# Patient Record
Sex: FEMALE | Race: BLACK OR AFRICAN AMERICAN | NOT HISPANIC OR LATINO | Employment: OTHER | URBAN - METROPOLITAN AREA
[De-identification: names, ages, dates, MRNs, and addresses within clinical notes are randomized per-mention and may not be internally consistent; named-entity substitution may affect disease eponyms.]

---

## 2023-01-17 ENCOUNTER — HOSPITAL ENCOUNTER (EMERGENCY)
Facility: HOSPITAL | Age: 63
Discharge: HOME | End: 2023-01-17
Attending: EMERGENCY MEDICINE
Payer: COMMERCIAL

## 2023-01-17 VITALS
RESPIRATION RATE: 16 BRPM | OXYGEN SATURATION: 98 % | HEIGHT: 62 IN | SYSTOLIC BLOOD PRESSURE: 148 MMHG | HEART RATE: 55 BPM | BODY MASS INDEX: 31.28 KG/M2 | DIASTOLIC BLOOD PRESSURE: 79 MMHG | WEIGHT: 170 LBS | TEMPERATURE: 98 F

## 2023-01-17 DIAGNOSIS — R03.0 ELEVATED BLOOD PRESSURE READING: Primary | ICD-10-CM

## 2023-01-17 DIAGNOSIS — I10 HYPERTENSION: ICD-10-CM

## 2023-01-17 LAB
ALBUMIN SERPL BCP-MCNC: 4 G/DL (ref 3.5–5.2)
ALP SERPL-CCNC: 97 U/L (ref 55–135)
ALT SERPL W/O P-5'-P-CCNC: 26 U/L (ref 10–44)
ANION GAP SERPL CALC-SCNC: 9 MMOL/L (ref 8–16)
AST SERPL-CCNC: 25 U/L (ref 10–40)
BASOPHILS # BLD AUTO: 0.03 K/UL (ref 0–0.2)
BASOPHILS NFR BLD: 0.4 % (ref 0–1.9)
BILIRUB SERPL-MCNC: 0.3 MG/DL (ref 0.1–1)
BUN SERPL-MCNC: 11 MG/DL (ref 8–23)
CALCIUM SERPL-MCNC: 9.5 MG/DL (ref 8.7–10.5)
CHLORIDE SERPL-SCNC: 105 MMOL/L (ref 95–110)
CO2 SERPL-SCNC: 24 MMOL/L (ref 23–29)
CREAT SERPL-MCNC: 0.8 MG/DL (ref 0.5–1.4)
DIFFERENTIAL METHOD: ABNORMAL
EOSINOPHIL # BLD AUTO: 0.1 K/UL (ref 0–0.5)
EOSINOPHIL NFR BLD: 1.4 % (ref 0–8)
ERYTHROCYTE [DISTWIDTH] IN BLOOD BY AUTOMATED COUNT: 12.3 % (ref 11.5–14.5)
EST. GFR  (NO RACE VARIABLE): >60 ML/MIN/1.73 M^2
GLUCOSE SERPL-MCNC: 115 MG/DL (ref 70–110)
HCT VFR BLD AUTO: 39 % (ref 37–48.5)
HCV AB SERPL QL IA: NORMAL
HGB BLD-MCNC: 12.8 G/DL (ref 12–16)
HIV 1+2 AB+HIV1 P24 AG SERPL QL IA: NORMAL
IMM GRANULOCYTES # BLD AUTO: 0.02 K/UL (ref 0–0.04)
IMM GRANULOCYTES NFR BLD AUTO: 0.3 % (ref 0–0.5)
INFLUENZA A, MOLECULAR: NOT DETECTED
INFLUENZA B, MOLECULAR: NOT DETECTED
LYMPHOCYTES # BLD AUTO: 1.2 K/UL (ref 1–4.8)
LYMPHOCYTES NFR BLD: 15.5 % (ref 18–48)
MCH RBC QN AUTO: 27.7 PG (ref 27–31)
MCHC RBC AUTO-ENTMCNC: 32.8 G/DL (ref 32–36)
MCV RBC AUTO: 84 FL (ref 82–98)
MONOCYTES # BLD AUTO: 0.4 K/UL (ref 0.3–1)
MONOCYTES NFR BLD: 5.6 % (ref 4–15)
NEUTROPHILS # BLD AUTO: 5.9 K/UL (ref 1.8–7.7)
NEUTROPHILS NFR BLD: 76.8 % (ref 38–73)
NRBC BLD-RTO: 0 /100 WBC
PLATELET # BLD AUTO: 252 K/UL (ref 150–450)
PMV BLD AUTO: 9.7 FL (ref 9.2–12.9)
POTASSIUM SERPL-SCNC: 3.6 MMOL/L (ref 3.5–5.1)
PROT SERPL-MCNC: 7.7 G/DL (ref 6–8.4)
RBC # BLD AUTO: 4.62 M/UL (ref 4–5.4)
RSV AG BY MOLECULAR METHOD: NOT DETECTED
SARS-COV-2 RNA RESP QL NAA+PROBE: NOT DETECTED
SODIUM SERPL-SCNC: 138 MMOL/L (ref 136–145)
WBC # BLD AUTO: 7.63 K/UL (ref 3.9–12.7)

## 2023-01-17 PROCEDURE — 87389 HIV-1 AG W/HIV-1&-2 AB AG IA: CPT | Performed by: PHYSICIAN ASSISTANT

## 2023-01-17 PROCEDURE — 99284 PR EMERGENCY DEPT VISIT,LEVEL IV: ICD-10-PCS | Mod: CS,,, | Performed by: EMERGENCY MEDICINE

## 2023-01-17 PROCEDURE — 93005 ELECTROCARDIOGRAM TRACING: CPT

## 2023-01-17 PROCEDURE — 80053 COMPREHEN METABOLIC PANEL: CPT | Performed by: PHYSICIAN ASSISTANT

## 2023-01-17 PROCEDURE — 93010 ELECTROCARDIOGRAM REPORT: CPT | Mod: ,,, | Performed by: INTERNAL MEDICINE

## 2023-01-17 PROCEDURE — 0241U SARS-COV2 (COVID) WITH FLU/RSV BY PCR: CPT | Performed by: PHYSICIAN ASSISTANT

## 2023-01-17 PROCEDURE — 25000003 PHARM REV CODE 250: Performed by: PHYSICIAN ASSISTANT

## 2023-01-17 PROCEDURE — 99285 EMERGENCY DEPT VISIT HI MDM: CPT | Mod: 25

## 2023-01-17 PROCEDURE — 93010 EKG 12-LEAD: ICD-10-PCS | Mod: ,,, | Performed by: INTERNAL MEDICINE

## 2023-01-17 PROCEDURE — 63600175 PHARM REV CODE 636 W HCPCS: Performed by: PHYSICIAN ASSISTANT

## 2023-01-17 PROCEDURE — 86803 HEPATITIS C AB TEST: CPT | Performed by: PHYSICIAN ASSISTANT

## 2023-01-17 PROCEDURE — 99284 EMERGENCY DEPT VISIT MOD MDM: CPT | Mod: CS,,, | Performed by: EMERGENCY MEDICINE

## 2023-01-17 PROCEDURE — 96374 THER/PROPH/DIAG INJ IV PUSH: CPT

## 2023-01-17 PROCEDURE — 96361 HYDRATE IV INFUSION ADD-ON: CPT

## 2023-01-17 PROCEDURE — 85025 COMPLETE CBC W/AUTO DIFF WBC: CPT | Performed by: PHYSICIAN ASSISTANT

## 2023-01-17 RX ORDER — AMLODIPINE BESYLATE 5 MG/1
5 TABLET ORAL DAILY
COMMUNITY
End: 2023-01-17

## 2023-01-17 RX ORDER — METOCLOPRAMIDE HYDROCHLORIDE 5 MG/ML
10 INJECTION INTRAMUSCULAR; INTRAVENOUS
Status: COMPLETED | OUTPATIENT
Start: 2023-01-17 | End: 2023-01-17

## 2023-01-17 RX ORDER — ACETAMINOPHEN 500 MG
1000 TABLET ORAL
Status: COMPLETED | OUTPATIENT
Start: 2023-01-17 | End: 2023-01-17

## 2023-01-17 RX ORDER — IBUPROFEN 800 MG/1
800 TABLET ORAL EVERY 6 HOURS PRN
Qty: 20 TABLET | Refills: 0 | Status: SHIPPED | OUTPATIENT
Start: 2023-01-17

## 2023-01-17 RX ORDER — AMLODIPINE BESYLATE 10 MG/1
10 TABLET ORAL DAILY
Qty: 30 TABLET | Refills: 0 | Status: SHIPPED | OUTPATIENT
Start: 2023-01-17

## 2023-01-17 RX ADMIN — SODIUM CHLORIDE, POTASSIUM CHLORIDE, SODIUM LACTATE AND CALCIUM CHLORIDE 1000 ML: 600; 310; 30; 20 INJECTION, SOLUTION INTRAVENOUS at 07:01

## 2023-01-17 RX ADMIN — ACETAMINOPHEN 1000 MG: 500 TABLET ORAL at 07:01

## 2023-01-17 RX ADMIN — METOCLOPRAMIDE 10 MG: 5 INJECTION, SOLUTION INTRAMUSCULAR; INTRAVENOUS at 07:01

## 2023-01-17 NOTE — ED TRIAGE NOTES
Sarita Saleh, a 62 y.o. female presents to the ED w/ complaint of headache and high BP. Pt reports taking her BP medication today but does not take it daily. + nausea     Triage note:  Chief Complaint   Patient presents with    Headache     Woke up with a headache- checked her bp and it was in the 200s systolic. Pt has not taken her bp medication in years but did take a amlodipine she still had pta.      Review of patient's allergies indicates:  No Known Allergies  Past Medical History:   Diagnosis Date    Hypertension

## 2023-01-17 NOTE — Clinical Note
"Sarita Golden"Delfino was seen and treated in our emergency department on 1/17/2023.  She may return to work on 01/18/2023.       If you have any questions or concerns, please don't hesitate to call.      Ananya Hull PA-C"

## 2023-01-17 NOTE — DISCHARGE INSTRUCTIONS
Your CT does not show any signs of acute stroke, bleed or masses.  Her blood work does not show any signs of anemia, electrolyte abnormalities, kidney failure, liver failure.  You do not have COVID, influenza, RSV.  Take ibuprofen 800 mg every 6 hours as needed with food for anti-inflammatory relief.  You can take acetaminophen/tylenol 650 mg every 6 hours or 1000 mg every 8 hours for added relief.  Apply ice to the area for 10-20 minutes every 4 hours. You can apply heat 2 days after for the same duration and frequency.  Follow up with PCP.  Return to the ER for new or worsening symptoms.  Imaging Results              CT Head Without Contrast (Final result)  Result time 01/17/23 08:05:22      Final result by Reyes Pizarro DO (01/17/23 08:05:22)                   Impression:      No acute intracranial findings as detailed above specifically without evidence for acute intracranial hemorrhage or sulcal effacement to suggest large territory recent infarction    Remote left basal gangliar infarction with encephalomalacia.  Clinical correlation and further evaluation as warranted      Electronically signed by: Reyes Pizarro DO  Date:    01/17/2023  Time:    08:05               Narrative:    EXAMINATION:  CT HEAD WITHOUT CONTRAST    CLINICAL HISTORY:  Headache, new or worsening (Age >= 50y);    TECHNIQUE:  Multiple sequential 5 mm axial images of the head without contrast.  Coronal and sagittal reformatted imaging from the axial acquisition.    COMPARISON:  None    FINDINGS:  Moderate focus of encephalomalacia in left basal ganglia most compatible with prior infarction.  There is no evidence for acute intracranial hemorrhage or sulcal effacement to suggest large territory recent infarction.  Compensatory enlargement left lateral ventricle.  No evidence for hydrocephalus.  No midline shift.  Trace mucosal thickening ethmoid air cells remaining visualized paranasal sinuses and mastoid air cells are clear.                                     Labs Reviewed   CBC W/ AUTO DIFFERENTIAL - Abnormal; Notable for the following components:       Result Value    Gran % 76.8 (*)     Lymph % 15.5 (*)     All other components within normal limits   COMPREHENSIVE METABOLIC PANEL - Abnormal; Notable for the following components:    Glucose 115 (*)     All other components within normal limits   HIV 1 / 2 ANTIBODY    Narrative:     Release to patient->Immediate   HEPATITIS C ANTIBODY    Narrative:     Release to patient->Immediate   SARS-COV2 (COVID) WITH FLU/RSV BY PCR

## 2023-01-17 NOTE — ED PROVIDER NOTES
Encounter Date: 1/17/2023       History     Chief Complaint   Patient presents with    Headache     Woke up with a headache- checked her bp and it was in the 200s systolic. Pt has not taken her bp medication in years but did take a amlodipine she still had pta.      7:08 AM  Patient is a 62-year-old female with a history of HTN on amlodipine 5 mg who presents to [facility] ED with a headache.  She states around 444, 3 hours ago, she woke up with a pounding severe posterior right headache.  She checked her blood pressure which was 171/111 mmHg.  She took her daily dose of amlodipine and had apple cider vinegar but when she continued to have a headache after she laid down she prompted herself to present to the ED for emergent evaluation.  She is had nausea without diplopia or photophobia.  She states that it is hard to focus but once she focuses her vision is not blurry.  She feels weak and dizzy. Has not had fever, chills, sore throat, myalgias, diarrhea, chest pain, or shortness of breath  No injury or trauma.  No history of headaches or neck pain.  She has a family history of HTN.  She did not take any medication for her symptoms.    She took an Uber to the ER.  Denies any hormone use.    Review of patient's allergies indicates:  No Known Allergies  Past Medical History:   Diagnosis Date    Hypertension      Past Surgical History:   Procedure Laterality Date    KNEE ARTHROPLASTY       History reviewed. No pertinent family history.  Social History     Tobacco Use    Smoking status: Unknown   Substance Use Topics    Alcohol use: Not Currently    Drug use: Not Currently     Review of Systems   Constitutional:  Negative for activity change, appetite change, chills and fever.   HENT:  Negative for sore throat.    Eyes:  Negative for photophobia, redness and visual disturbance.   Respiratory:  Negative for cough and shortness of breath.    Cardiovascular:  Negative for chest pain.   Gastrointestinal:  Positive for nausea.  Negative for abdominal pain and vomiting.   Genitourinary:  Negative for dysuria.   Musculoskeletal:  Negative for back pain.   Skin:  Negative for rash.   Neurological:  Positive for dizziness, weakness and headaches.   Hematological:  Does not bruise/bleed easily.     Physical Exam     Initial Vitals [01/17/23 0552]   BP Pulse Resp Temp SpO2   (!) 193/95 65 16 98.1 °F (36.7 °C) 98 %      MAP       --         Physical Exam    Vitals reviewed.  Constitutional: She appears well-developed and well-nourished. She is not diaphoretic. She is cooperative.  Non-toxic appearance. She does not have a sickly appearance. She does not appear ill. No distress. Face mask in place.   HENT:   Head: Normocephalic and atraumatic. Head is without raccoon's eyes.   Right Ear: External ear and ear canal normal.   Left Ear: External ear and ear canal normal.   Nose: Nose normal.   Mouth/Throat: No trismus in the jaw.   Cerumen in bilateral ears.   Eyes: Conjunctivae and EOM are normal. Pupils are equal, round, and reactive to light. Right conjunctiva is not injected. Right conjunctiva has no hemorrhage. Left conjunctiva is not injected. Left conjunctiva has no hemorrhage. No scleral icterus. Right eye exhibits no nystagmus. Left eye exhibits no nystagmus. Pupils are equal.   Neck:   Normal range of motion.  Pulmonary/Chest: No accessory muscle usage. No tachypnea. No respiratory distress.   Abdominal: She exhibits no distension.   Musculoskeletal:         General: Normal range of motion.      Cervical back: Normal range of motion. No rigidity. Normal range of motion.     Neurological: She is alert. She has normal strength. She displays no tremor. Coordination and gait normal.   No facial asymmetry.  Clear speech.  Provides full history.  Good finger .  Full range of motion throughout.  No difficulty bearing weight or ambulating.  Normal finger-to-nose, rapid alternating hand movements, finger pincer movements.  Negative pronator and  Romberg's.   Skin: Skin is warm and dry. No erythema. No pallor.       ED Course   Procedures  Labs Reviewed   CBC W/ AUTO DIFFERENTIAL - Abnormal; Notable for the following components:       Result Value    Gran % 76.8 (*)     Lymph % 15.5 (*)     All other components within normal limits   COMPREHENSIVE METABOLIC PANEL - Abnormal; Notable for the following components:    Glucose 115 (*)     All other components within normal limits   HIV 1 / 2 ANTIBODY    Narrative:     Release to patient->Immediate   HEPATITIS C ANTIBODY    Narrative:     Release to patient->Immediate   SARS-COV2 (COVID) WITH FLU/RSV BY PCR        ECG Results              EKG 12-lead (Final result)  Result time 01/17/23 08:13:47      Final result by Interface, Lab In [facility] (01/17/23 08:13:47)                   Narrative:    Test Reason : I10,    Vent. Rate : 063 BPM     Atrial Rate : 063 BPM     P-R Int : 168 ms          QRS Dur : 080 ms      QT Int : 398 ms       P-R-T Axes : 065 083 109 degrees     QTc Int : 407 ms    Normal sinus rhythm  Nonspecific T wave abnormality  Abnormal ECG  No previous ECGs available  Confirmed by John SOTO MD ([id number]) on 1/17/2023 8:13:42 AM    Referred By: [id number]   SELF           Confirmed By:John SOTO MD                                  Imaging Results              CT Head Without Contrast (Final result)  Result time 01/17/23 08:05:22      Final result by Reyes Pizarro DO (01/17/23 08:05:22)                   Impression:      No acute intracranial findings as detailed above specifically without evidence for acute intracranial hemorrhage or sulcal effacement to suggest large territory recent infarction    Remote left basal gangliar infarction with encephalomalacia.  Clinical correlation and further evaluation as warranted      Electronically signed by: Reyes Pizarro DO  Date:    01/17/2023  Time:    08:05               Narrative:    EXAMINATION:  CT HEAD WITHOUT CONTRAST    CLINICAL HISTORY:  Headache, new  or worsening (Age >= 50y);    TECHNIQUE:  Multiple sequential 5 mm axial images of the head without contrast.  Coronal and sagittal reformatted imaging from the axial acquisition.    COMPARISON:  None    FINDINGS:  Moderate focus of encephalomalacia in left basal ganglia most compatible with prior infarction.  There is no evidence for acute intracranial hemorrhage or sulcal effacement to suggest large territory recent infarction.  Compensatory enlargement left lateral ventricle.  No evidence for hydrocephalus.  No midline shift.  Trace mucosal thickening ethmoid air cells remaining visualized paranasal sinuses and mastoid air cells are clear.                                       Medications   lactated ringers bolus 1,000 mL (0 mLs Intravenous Stopped 1/17/23 0916)   acetaminophen tablet 1,000 mg (1,000 mg Oral Given 1/17/23 7941)   metoclopramide HCl injection 10 mg (10 mg Intravenous Given 1/17/23 8447)     Medical Decision Making:   History:   Old Medical Records: I decided to obtain old medical records.  Old Records Summarized: records from another hospital.  Initial Assessment:   Patient is a 62-year-old female with a history of HTN on amlodipine 5 mg who presents to [facility] ED with a headache.   Differential Diagnosis:   Includes but is not limited to hypertensive headache, encephalopathy, emergency, tension headache, migraine headache, cluster headache, cervicogenic headache, sinus headache, viral syndrome. No nuchal rigidity.  Nontoxic appearing. Doubt meningitis.   Independently Interpreted Test(s):   I have ordered and independently interpreted EKG Reading(s) - see summary below  Clinical Tests:   Lab Tests: Ordered and Reviewed  Radiological Study: Ordered and Reviewed  Medical Tests: Reviewed and Ordered  ED Management:  On my independent interpretation, EKG with NSR at 63 beats per minute.  T-wave inversion in aVL.  Normal intervals.  No ST changes.  No STEMI.    Patient had her headaches less than 6 hours  ago.  Will obtain CT head, basic labs, give IV fluids and for symptomatic improvement, and reassess.           ED Course as of 01/17/23 1451   Tue Jan 17, 2023   0706 BP(!): 193/95 [CL]   0706 Temp: 98.1 °F (36.7 °C) [CL]   0706 Pulse: 65 [CL]   0706 Resp: 16 [CL]   0706 SpO2: 98 % [CL]   0843 CT Head Without Contrast  No acute intracranial findings as detailed above specifically without evidence for acute intracranial hemorrhage or sulcal effacement to suggest large territory recent infarction. Remote left basal gangliar infarction with encephalomalacia.      Patient with onset of symptoms within 6 hours of presentation. CT negative. She is still without focal neuro deficits. She has resolution of her headache. I have a very low suspicion for SAH. I do not feel that she will benefit from further workup for her resolved headache. [CL]   0844 WBC: 7.63 [CL]   0844 Hemoglobin: 12.8 [CL]   0844 Platelets: 252 [CL]   0845 Potassium: 3.6 [CL]   0845 Sodium: 138 [CL]   0845 Glucose(!): 115 [CL]   0845 BUN: 11 [CL]   0845 Creatinine: 0.8 [CL]   0845 BILIRUBIN TOTAL: 0.3 [CL]   0845 AST: 25 [CL]   0845 ALT: 26 [CL]   0916 BP(!): 148/79  Without intervention here.  [CL]   0917 SARS-CoV2 (COVID-19) Qualitative PCR: Not Detected [CL]   0917 Influenza A, Molecular: Not Detected [CL]   0917 Influenza B, Molecular: Not Detected [CL]   0917 RSV Ag by Molecular Method: Not Detected [CL]      ED Course User Index  [CL] Ananya Hull PA-C          Patient reassessed.  She was updated with her results.  She reports significant improvement in her symptoms while here.  Her labs and imaging test are unremarkable.  She was made aware about signs of remote infarction.  She was provided with abnormal labs and imaging test impression so she can bring back with her to [place] when she returns.  We discussed increasing amlodipine to 10 mg daily if her blood pressure remains greater than 140 systolic.  Check twice a day.  She has a blood  pressure cuff at home.  All of her questions were answered.  Return to ED precautions were given.  Patient comfortable with plan and stable for discharge.         I have reviewed patient's chart and discussed this case with my supervising MD.     Ananya Hull PA-C  Emergent Department  [facility]  Spectralink #[phone] or #[phone]    Clinical Impression:   Final diagnoses:  [I10] Hypertension  [R03.0] Elevated blood pressure reading (Primary)        ED Disposition Condition    Discharge Stable          ED Prescriptions       Medication Sig Dispense Start Date End Date Auth. Provider    ibuprofen (ADVIL,MOTRIN) 800 MG tablet Take 1 tablet (800 mg total) by mouth every 6 (six) hours as needed for Pain. 20 tablet 1/17/2023 -- Ananya Hull PA-C    amLODIPine (NORVASC) 10 MG tablet Take 1 tablet (10 mg total) by mouth once daily. 30 tablet 1/17/2023 -- Ananya Hull PA-C          Follow-up Information       Follow up With Specialties Details Why Contact Info Additional Information    [facility] Internal Medicine Schedule an appointment as soon as possible for a visit   [address]  [facility] [id number] [phone] [facility] Please park in surface lot and check in at central registration desk    [facility] Emergency Medicine  If symptoms worsen [address]  [facility] [phone]                Ananya Hull PA-C  01/17/23 0093